# Patient Record
Sex: FEMALE | Race: WHITE
[De-identification: names, ages, dates, MRNs, and addresses within clinical notes are randomized per-mention and may not be internally consistent; named-entity substitution may affect disease eponyms.]

---

## 2018-02-28 ENCOUNTER — HOSPITAL ENCOUNTER (OUTPATIENT)
Dept: HOSPITAL 95 - OLS | Age: 68
Discharge: HOME | End: 2018-02-28
Payer: MEDICARE

## 2018-02-28 DIAGNOSIS — A31.0: Primary | ICD-10-CM

## 2018-06-29 ENCOUNTER — HOSPITAL ENCOUNTER (INPATIENT)
Dept: HOSPITAL 95 - ER | Age: 68
LOS: 7 days | Discharge: HOME | DRG: 178 | End: 2018-07-06
Attending: HOSPITALIST | Admitting: HOSPITALIST
Payer: MEDICARE

## 2018-06-29 VITALS — HEIGHT: 64 IN | WEIGHT: 185.63 LBS | BODY MASS INDEX: 31.69 KG/M2

## 2018-06-29 DIAGNOSIS — J96.11: ICD-10-CM

## 2018-06-29 DIAGNOSIS — D63.8: ICD-10-CM

## 2018-06-29 DIAGNOSIS — J43.1: ICD-10-CM

## 2018-06-29 DIAGNOSIS — J18.8: ICD-10-CM

## 2018-06-29 DIAGNOSIS — E87.1: ICD-10-CM

## 2018-06-29 DIAGNOSIS — E86.0: ICD-10-CM

## 2018-06-29 DIAGNOSIS — A31.0: Primary | ICD-10-CM

## 2018-06-29 DIAGNOSIS — Z87.891: ICD-10-CM

## 2018-06-29 DIAGNOSIS — Z99.81: ICD-10-CM

## 2018-06-29 LAB
ALBUMIN SERPL BCP-MCNC: 2.5 G/DL (ref 3.4–5)
ALBUMIN/GLOB SERPL: 0.5 {RATIO} (ref 0.8–1.8)
ALT SERPL W P-5'-P-CCNC: 32 U/L (ref 12–78)
ANION GAP SERPL CALCULATED.4IONS-SCNC: 12 MMOL/L (ref 6–16)
AST SERPL W P-5'-P-CCNC: 25 U/L (ref 12–37)
BASOPHILS # BLD AUTO: 0.01 K/MM3 (ref 0–0.23)
BASOPHILS NFR BLD AUTO: 0 % (ref 0–2)
BILIRUB SERPL-MCNC: 2.1 MG/DL (ref 0.1–1)
BUN SERPL-MCNC: 8 MG/DL (ref 8–24)
CALCIUM SERPL-MCNC: 8.2 MG/DL (ref 8.5–10.1)
CHLORIDE SERPL-SCNC: 92 MMOL/L (ref 98–108)
CO2 SERPL-SCNC: 26 MMOL/L (ref 21–32)
CREAT SERPL-MCNC: 0.79 MG/DL (ref 0.4–1)
DEPRECATED RDW RBC AUTO: 43.2 FL (ref 35.1–46.3)
EOSINOPHIL # BLD AUTO: 0.43 K/MM3 (ref 0–0.68)
EOSINOPHIL NFR BLD AUTO: 5 % (ref 0–6)
ERYTHROCYTE [DISTWIDTH] IN BLOOD BY AUTOMATED COUNT: 13.2 % (ref 11.7–14.2)
GLOBULIN SER CALC-MCNC: 4.6 G/DL (ref 2.2–4)
GLUCOSE SERPL-MCNC: 139 MG/DL (ref 70–99)
HCT VFR BLD AUTO: 30.8 % (ref 33–51)
HGB BLD-MCNC: 10.4 G/DL (ref 11.5–16)
IMM GRANULOCYTES # BLD AUTO: 0.1 K/MM3 (ref 0–0.1)
IMM GRANULOCYTES NFR BLD AUTO: 1 % (ref 0–1)
LYMPHOCYTES # BLD AUTO: 0.98 K/MM3 (ref 0.84–5.2)
LYMPHOCYTES NFR BLD AUTO: 10 % (ref 21–46)
MCHC RBC AUTO-ENTMCNC: 33.8 G/DL (ref 31.5–36.5)
MCV RBC AUTO: 89 FL (ref 80–100)
MONOCYTES # BLD AUTO: 0.85 K/MM3 (ref 0.16–1.47)
MONOCYTES NFR BLD AUTO: 9 % (ref 4–13)
NEUTROPHILS # BLD AUTO: 7.03 K/MM3 (ref 1.96–9.15)
NEUTROPHILS NFR BLD AUTO: 75 % (ref 41–73)
NRBC # BLD AUTO: 0 K/MM3 (ref 0–0.02)
NRBC BLD AUTO-RTO: 0 /100 WBC (ref 0–0.2)
PH BLDA: 7.48 [PH] (ref 7.35–7.45)
PLATELET # BLD AUTO: 216 K/MM3 (ref 150–400)
POTASSIUM SERPL-SCNC: 4 MMOL/L (ref 3.5–5.5)
PROT SERPL-MCNC: 7.1 G/DL (ref 6.4–8.2)
SODIUM SERPL-SCNC: 130 MMOL/L (ref 136–145)
TROPONIN I SERPL-MCNC: <0.015 NG/ML (ref 0–0.04)

## 2018-06-30 LAB
ALBUMIN SERPL BCP-MCNC: 2.3 G/DL (ref 3.4–5)
ALBUMIN/GLOB SERPL: 0.6 {RATIO} (ref 0.8–1.8)
ALT SERPL W P-5'-P-CCNC: 24 U/L (ref 12–78)
ANION GAP SERPL CALCULATED.4IONS-SCNC: 10 MMOL/L (ref 6–16)
AST SERPL W P-5'-P-CCNC: 21 U/L (ref 12–37)
BILIRUB SERPL-MCNC: 1.7 MG/DL (ref 0.1–1)
BUN SERPL-MCNC: 9 MG/DL (ref 8–24)
CALCIUM SERPL-MCNC: 7.7 MG/DL (ref 8.5–10.1)
CHLORIDE SERPL-SCNC: 94 MMOL/L (ref 98–108)
CO2 SERPL-SCNC: 26 MMOL/L (ref 21–32)
CREAT SERPL-MCNC: 0.72 MG/DL (ref 0.4–1)
DEPRECATED RDW RBC AUTO: 43.3 FL (ref 35.1–46.3)
ERYTHROCYTE [DISTWIDTH] IN BLOOD BY AUTOMATED COUNT: 13.2 % (ref 11.7–14.2)
GLOBULIN SER CALC-MCNC: 4.1 G/DL (ref 2.2–4)
GLUCOSE SERPL-MCNC: 131 MG/DL (ref 70–99)
HCT VFR BLD AUTO: 28.2 % (ref 33–51)
HGB BLD-MCNC: 9.6 G/DL (ref 11.5–16)
MCHC RBC AUTO-ENTMCNC: 34 G/DL (ref 31.5–36.5)
MCV RBC AUTO: 90 FL (ref 80–100)
NRBC # BLD AUTO: 0 K/MM3 (ref 0–0.02)
NRBC BLD AUTO-RTO: 0 /100 WBC (ref 0–0.2)
PLATELET # BLD AUTO: 201 K/MM3 (ref 150–400)
POTASSIUM SERPL-SCNC: 3.8 MMOL/L (ref 3.5–5.5)
PROT SERPL-MCNC: 6.4 G/DL (ref 6.4–8.2)
SODIUM SERPL-SCNC: 130 MMOL/L (ref 136–145)

## 2018-07-02 LAB
ANION GAP SERPL CALCULATED.4IONS-SCNC: 11 MMOL/L (ref 6–16)
BASOPHILS # BLD AUTO: 0.03 K/MM3 (ref 0–0.23)
BASOPHILS NFR BLD AUTO: 0 % (ref 0–2)
BUN SERPL-MCNC: 7 MG/DL (ref 8–24)
CALCIUM SERPL-MCNC: 7.7 MG/DL (ref 8.5–10.1)
CHLORIDE SERPL-SCNC: 104 MMOL/L (ref 98–108)
CO2 SERPL-SCNC: 25 MMOL/L (ref 21–32)
CREAT SERPL-MCNC: 0.76 MG/DL (ref 0.4–1)
DEPRECATED RDW RBC AUTO: 46.7 FL (ref 35.1–46.3)
EOSINOPHIL # BLD AUTO: 1.85 K/MM3 (ref 0–0.68)
EOSINOPHIL NFR BLD AUTO: 20 % (ref 0–6)
ERYTHROCYTE [DISTWIDTH] IN BLOOD BY AUTOMATED COUNT: 13.8 % (ref 11.7–14.2)
GLUCOSE SERPL-MCNC: 118 MG/DL (ref 70–99)
HCT VFR BLD AUTO: 27.3 % (ref 33–51)
HGB BLD-MCNC: 9 G/DL (ref 11.5–16)
IMM GRANULOCYTES # BLD AUTO: 0.13 K/MM3 (ref 0–0.1)
IMM GRANULOCYTES NFR BLD AUTO: 1 % (ref 0–1)
LYMPHOCYTES # BLD AUTO: 1.16 K/MM3 (ref 0.84–5.2)
LYMPHOCYTES NFR BLD AUTO: 13 % (ref 21–46)
MCHC RBC AUTO-ENTMCNC: 33 G/DL (ref 31.5–36.5)
MCV RBC AUTO: 92 FL (ref 80–100)
MONOCYTES # BLD AUTO: 0.57 K/MM3 (ref 0.16–1.47)
MONOCYTES NFR BLD AUTO: 6 % (ref 4–13)
NEUTROPHILS # BLD AUTO: 5.55 K/MM3 (ref 1.96–9.15)
NEUTROPHILS NFR BLD AUTO: 60 % (ref 41–73)
NRBC # BLD AUTO: 0 K/MM3 (ref 0–0.02)
NRBC BLD AUTO-RTO: 0 /100 WBC (ref 0–0.2)
PLATELET # BLD AUTO: 229 K/MM3 (ref 150–400)
POTASSIUM SERPL-SCNC: 3.9 MMOL/L (ref 3.5–5.5)
SODIUM SERPL-SCNC: 140 MMOL/L (ref 136–145)

## 2018-07-04 LAB
ALBUMIN SERPL BCP-MCNC: 2.3 G/DL (ref 3.4–5)
ANION GAP SERPL CALCULATED.4IONS-SCNC: 7 MMOL/L (ref 6–16)
BASOPHILS # BLD AUTO: 0.03 K/MM3 (ref 0–0.23)
BASOPHILS NFR BLD AUTO: 0 % (ref 0–2)
BUN SERPL-MCNC: 6 MG/DL (ref 8–24)
CALCIUM SERPL-MCNC: 7.9 MG/DL (ref 8.5–10.1)
CHLORIDE SERPL-SCNC: 105 MMOL/L (ref 98–108)
CO2 SERPL-SCNC: 28 MMOL/L (ref 21–32)
CREAT SERPL-MCNC: 0.69 MG/DL (ref 0.4–1)
DEPRECATED RDW RBC AUTO: 46.8 FL (ref 35.1–46.3)
EOSINOPHIL # BLD AUTO: 1.66 K/MM3 (ref 0–0.68)
EOSINOPHIL NFR BLD AUTO: 20 % (ref 0–6)
ERYTHROCYTE [DISTWIDTH] IN BLOOD BY AUTOMATED COUNT: 13.9 % (ref 11.7–14.2)
GLUCOSE SERPL-MCNC: 135 MG/DL (ref 70–99)
HCT VFR BLD AUTO: 29.1 % (ref 33–51)
HGB BLD-MCNC: 9.5 G/DL (ref 11.5–16)
IMM GRANULOCYTES # BLD AUTO: 0.29 K/MM3 (ref 0–0.1)
IMM GRANULOCYTES NFR BLD AUTO: 4 % (ref 0–1)
LYMPHOCYTES # BLD AUTO: 0.91 K/MM3 (ref 0.84–5.2)
LYMPHOCYTES NFR BLD AUTO: 11 % (ref 21–46)
MCHC RBC AUTO-ENTMCNC: 32.6 G/DL (ref 31.5–36.5)
MCV RBC AUTO: 92 FL (ref 80–100)
MONOCYTES # BLD AUTO: 0.49 K/MM3 (ref 0.16–1.47)
MONOCYTES NFR BLD AUTO: 6 % (ref 4–13)
NEUTROPHILS # BLD AUTO: 4.76 K/MM3 (ref 1.96–9.15)
NEUTROPHILS NFR BLD AUTO: 58 % (ref 41–73)
NRBC # BLD AUTO: 0.02 K/MM3 (ref 0–0.02)
NRBC BLD AUTO-RTO: 0.2 /100 WBC (ref 0–0.2)
PHOSPHATE SERPL-MCNC: 3.6 MG/DL (ref 2.5–4.9)
PLATELET # BLD AUTO: 239 K/MM3 (ref 150–400)
POTASSIUM SERPL-SCNC: 4 MMOL/L (ref 3.5–5.5)
SODIUM SERPL-SCNC: 140 MMOL/L (ref 136–145)

## 2018-07-05 LAB
BASOPHILS # BLD AUTO: 0.03 K/MM3 (ref 0–0.23)
BASOPHILS NFR BLD AUTO: 0 % (ref 0–2)
DEPRECATED RDW RBC AUTO: 47.1 FL (ref 35.1–46.3)
EOSINOPHIL # BLD AUTO: 1.99 K/MM3 (ref 0–0.68)
EOSINOPHIL NFR BLD AUTO: 23 % (ref 0–6)
ERYTHROCYTE [DISTWIDTH] IN BLOOD BY AUTOMATED COUNT: 14.1 % (ref 11.7–14.2)
HCT VFR BLD AUTO: 29.3 % (ref 33–51)
HGB BLD-MCNC: 9.2 G/DL (ref 11.5–16)
IMM GRANULOCYTES # BLD AUTO: 0.34 K/MM3 (ref 0–0.1)
IMM GRANULOCYTES NFR BLD AUTO: 4 % (ref 0–1)
LYMPHOCYTES # BLD AUTO: 1.13 K/MM3 (ref 0.84–5.2)
LYMPHOCYTES NFR BLD AUTO: 13 % (ref 21–46)
MCHC RBC AUTO-ENTMCNC: 31.4 G/DL (ref 31.5–36.5)
MCV RBC AUTO: 92 FL (ref 80–100)
MONOCYTES # BLD AUTO: 0.59 K/MM3 (ref 0.16–1.47)
MONOCYTES NFR BLD AUTO: 7 % (ref 4–13)
NEUTROPHILS # BLD AUTO: 4.44 K/MM3 (ref 1.96–9.15)
NEUTROPHILS NFR BLD AUTO: 52 % (ref 41–73)
NRBC # BLD AUTO: 0 K/MM3 (ref 0–0.02)
NRBC BLD AUTO-RTO: 0 /100 WBC (ref 0–0.2)
PLATELET # BLD AUTO: 249 K/MM3 (ref 150–400)

## 2018-07-06 LAB
BASOPHILS # BLD: 0 K/MM3 (ref 0–0.23)
BASOPHILS NFR BLD: 0 % (ref 0–2)
DEPRECATED RDW RBC AUTO: 47.8 FL (ref 35.1–46.3)
EOSINOPHIL # BLD: 1.31 K/MM3 (ref 0–0.68)
EOSINOPHIL NFR BLD: 17 % (ref 0–6)
ERYTHROCYTE [DISTWIDTH] IN BLOOD BY AUTOMATED COUNT: 14.1 % (ref 11.7–14.2)
HCT VFR BLD AUTO: 31.9 % (ref 33–51)
HGB BLD-MCNC: 10.3 G/DL (ref 11.5–16)
LYMPHOCYTES # BLD: 0.61 K/MM3 (ref 0.84–5.2)
LYMPHOCYTES NFR BLD: 8 % (ref 21–46)
MCHC RBC AUTO-ENTMCNC: 32.3 G/DL (ref 31.5–36.5)
MCV RBC AUTO: 93 FL (ref 80–100)
MONOCYTES # BLD: 0.46 K/MM3 (ref 0.16–1.47)
MONOCYTES NFR BLD: 6 % (ref 4–13)
MYELOCYTES # BLD MANUAL: 0.15 K/MM3 (ref 0–0)
MYELOCYTES NFR BLD MANUAL: 2 % (ref 0–0)
NEUTS BAND NFR BLD MANUAL: 2 % (ref 0–8)
NEUTS SEG # BLD MANUAL: 5.17 K/MM3 (ref 1.96–9.15)
NEUTS SEG NFR BLD MANUAL: 65 % (ref 41–73)
NRBC # BLD AUTO: 0 K/MM3 (ref 0–0.02)
NRBC BLD AUTO-RTO: 0 /100 WBC (ref 0–0.2)
PLATELET # BLD AUTO: 247 K/MM3 (ref 150–400)
TOTAL CELLS COUNTED BLD: 100

## 2018-07-22 ENCOUNTER — HOSPITAL ENCOUNTER (OUTPATIENT)
Dept: HOSPITAL 95 - LAB SHORT | Age: 68
Discharge: HOME | End: 2018-07-22
Attending: INTERNAL MEDICINE
Payer: MEDICARE

## 2018-07-22 DIAGNOSIS — A31.0: Primary | ICD-10-CM

## 2018-08-15 ENCOUNTER — HOSPITAL ENCOUNTER (OUTPATIENT)
Dept: HOSPITAL 95 - LAB SHORT | Age: 68
Discharge: HOME | End: 2018-08-15
Attending: INTERNAL MEDICINE
Payer: MEDICARE

## 2018-08-15 DIAGNOSIS — A31.0: Primary | ICD-10-CM

## 2019-04-11 ENCOUNTER — HOSPITAL ENCOUNTER (OUTPATIENT)
Dept: HOSPITAL 95 - LAB | Age: 69
Discharge: HOME | End: 2019-04-11
Attending: INTERNAL MEDICINE
Payer: MEDICARE

## 2019-04-11 DIAGNOSIS — A31.0: Primary | ICD-10-CM

## 2023-01-25 NOTE — NUR
ADMIT/POLST
REPORT RECIEVED FROM EMELY HUGHES. PT ARRIVED ACCOMPANIED BY RN VIA OVLINRNEY AND
DAUGHTER. PT VERY SOB. AUDIBLE WHEEZING NOTED. SLID TO NEW BED WITH SLIDER
SHEET AND 4 ASSIST. PT UNABLE TO LAY BACK. NEEDED TO SIT UP. RESP CARE CALLED
FOR BREATHING TX. MELI R/T ARRIVED. AFTER BREATHING TREATMENT PT REQUESTED
TO TALK ABOUT HER CODE STATUS. SHE HAS AN ADVANCED DIRECTIVE THAT SAYS NO
ADVANCED LIFE SUPPORT. SHE WANTED TO KNOW WHAT CPAP/BIPAP WAS. EXPLAINED TO
HER. SHE IS SIGNIFICANTLY CLAUSTERPHOBIC. SHE DOES NOT WANT ANY MASKS ON HER
FACE. OFFERED HER A POLST FORM. SHE AND HER DAUGHTER FILLED IT OUT. DNR/DNI.
CALLED DR RATLIFF. ORDER RECIEVED FOR DNR. ORDER FOR BREATHING INHALERS AS
WELL. R/T NOTIFIED OF NEW ORDERS. PASSED ON PT TROPININ I RESULTS AS WELL.
CONTINUE POC.

## 2023-01-25 NOTE — NUR
COMMODE
INCREASED TO 6L N/C FOR TRANSFER. SAT 95% ON 4L AT REST. SHE SAT ON THE SIDE
OF THE BED. SAT DROPPED TO 90%  BPM. STOOD AND TRANSFERED TO BSC. 
SAT 91%. vOIDED 300 OF DARK ORANGE URINE. SHE SAT FOR A LONG TIME ON THE BSC
TRYING TO CATCH HER BREATH WITH THE FAN. RETURNED TO BED. 91% TO 77%.
RECOVERING SLOWLY. EYES CLOSED. AUDIBLE WHEEZE NOTED. CONTINUE POC.

## 2023-01-26 NOTE — NUR
SUMMARY: PATIENT ON 5L NC. DOES GET VERY SOB AND TACHYCARDIC WITH POSITION
CHANGES AND COUGHING FITS. PATIENT HAD AN EPISODE WHERE SHE COULD NOT CATCH
HER BREATH AFTER COUGHING. PLACED ON NON REBREATHER UNTIL RESP TEAM COULD
ARRIVE FOR BREATHING TREATMENT. PATIENT QUICKLY RECOVERED WITH INTERVENTION
AND RETURN TO THE 5L NC. PATIENT DOES NOT TOLERATE NON-REBREATHER MASK WELL AS
IT MAKES HER FEEL ANXIOUS AND HOT. ALL OTHER VSS. PATIENT RESTING IN BED WITH
HEAD ALL THE WAY ELEVATED. CALL LIGHT IN REACH. BED ALARM ON.

## 2023-01-26 NOTE — NUR
Pt is a 72 year old female with history of "refractory and progressive
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
The patient also has "bullous emphysema" according to H and P. Her pulmonary
care plan was created by [facility], carried out with local pulmonologist.
She presented to the ER with dyspnea, and uses 4L of 02 continuously. She is
currently requiring 6L on high-flow.
  According to pt and her chart, she is currently "end stage" overall related
to pulmonary function. After speaking to pt and family, pt is electing to
try small dose of roxanol, and are seriously contemplating comfort
care/community hospice. Daughter agrees she would be primary CG at pt's home.
Pt reports "nervous" about comfort care, would like to try a "dose or 2" prior
to making a decision.
  V.O. for Roxanol 5mg po Q4 prn placed. Bedside RN aware, will administer.
Palliative care will continue to follow.

## 2023-01-26 NOTE — NUR
EVENING NOTE
PT ALERT AND ORIENTED. PT UNABLE TO TOLERATE MUCH ACTIVITY. EVEN SHIFTING SIDE
TO SIDE DROPS HER SPO2. ON 15L HIGH FLOW CANNULA. HOB 60 DEGREES. BED SIDE FAN
RUNNING. STARTED ROXANOL 5MG TODAY. SHE LIKES HOW IT MAKES HER FEEL BUT IT
TASTES TERRIBLE. SMALL ORAL INTAKE. DAUGHTERS AT BEDSIDE. CONTINUE POC.

## 2023-01-26 NOTE — NUR
Placed patient on high flow nasal cannula so we can titrate O2 up while she is
getting up to commode. Patient currently on 6L high flow cannula resting in
bed after getting up to use commode.

## 2023-01-27 NOTE — NUR
SHIFT SUMMARY
 
PATIENT DENIES PAIN AND NAUSEA. PATIENT REPORTS SHORTNESS OF BREATH. PATIENT
MEDICATED X2 FOR AIR HUNGER. PATIENT IS ON 8L HIGH FLOW SATURATING AT 94%.
PALLIATIVE CARE TALKED WITH FAMILY AND PATIENT TODAY. PATIENT TO DISCHARGE
TOMORROW AT 10:30AM HOME WITH HOSPICE SERVICES. PATIENT HAD FAMILY AT BEDSIDE
ALL SHIFT. PATIENT A&O X4 AND VERY PLEASANT. LORA IS PATENT AND DRAINING TO
GRAVITY. PATIENT IS EATING AND DRINKING WELL. PATIENT IS VERY PLEASANT AND
COOPERATIVE WITH CARE.

## 2023-01-27 NOTE — NUR
PT IS A&O4, BEDREST, HOB AT 60 DEGRESS, 15L HI-MAURICIO NC PT TOLERATING WELL SATS
UPPER 90'S THIS SHIFT, LORA DRAINING DARK CAM URINE, PT WAS ABLE TO SLEEP
COMFORTABLY WITH NO
COMPLAINTS OF PAIN OVERNIGHT, VSS, NO ACUTE OVERNIGHT EVENTS
CONTINUE POC

## 2023-01-28 NOTE — NUR
1043-DISCHARGE-
JAYNA RN INFORMED THIS RN THAT PT WAS NOT IN HER ROOM. TRANSPORT HAD TAKEN PT
FROM THE HOSPITAL WITHOUT RECEIVING ANY SBAR OR REPORT FROM ANY HOSPITAL
STAFF. PT DID NOT HAVE AN IV. PT DID NOT LEAVE WITH HER PAPERWORK OR HOME
MEDS. DAUGHTER INFORMED-SHE WILL COME  HOME MEDS AND DC PACKET. [facility] INFORMED OF INCIDENT.

## 2023-01-28 NOTE — NUR
PT IS A&O4, BEDREST, 8L HIFLOW NC SATS IN THE LOW 90'S, PRN ROXANOL GIVEN PER
MAR X1 OVERNIGHT, LORA PATENT DRAINING CAM URINE, PT WILL DC WITH LORA
TODAY WITH SCHEDULED DISCHARGE AT 10AM WITH AMBULANCE SERVICE. NO ACUTE
OVERNIGHT EVENTS